# Patient Record
Sex: MALE
[De-identification: names, ages, dates, MRNs, and addresses within clinical notes are randomized per-mention and may not be internally consistent; named-entity substitution may affect disease eponyms.]

---

## 2022-08-03 PROBLEM — Z00.00 ENCOUNTER FOR PREVENTIVE HEALTH EXAMINATION: Status: ACTIVE | Noted: 2022-08-03

## 2022-09-23 ENCOUNTER — APPOINTMENT (OUTPATIENT)
Dept: ORTHOPEDIC SURGERY | Facility: CLINIC | Age: 47
End: 2022-09-23

## 2022-09-27 ENCOUNTER — APPOINTMENT (OUTPATIENT)
Dept: ORTHOPEDIC SURGERY | Facility: CLINIC | Age: 47
End: 2022-09-27

## 2022-09-27 DIAGNOSIS — S83.241A OTHER TEAR OF MEDIAL MENISCUS, CURRENT INJURY, RIGHT KNEE, INITIAL ENCOUNTER: ICD-10-CM

## 2022-09-27 PROCEDURE — 99214 OFFICE O/P EST MOD 30 MIN: CPT | Mod: 57

## 2022-09-30 PROBLEM — S83.241A TEAR OF MEDIAL MENISCUS OF RIGHT KNEE: Status: ACTIVE | Noted: 2022-09-30

## 2022-09-30 NOTE — HISTORY OF PRESENT ILLNESS
[de-identified] : Patient here for evaluation right knee pain.\par Complains of joint line pain\par Positive mechanical symptoms and catching\par \par NAD\par Right knee\par No skin breakdown\par Medial joint line ttp\par Positive odilia\par Negative lachman\par Negative varus/valgus instability\par ROM 0-130\par Pain with forced extension and flexion\par NVI\par Compartments soft and NT\par \par Xray reviewed and significant for right knee mild degenerative changes\par \par xray right knee grossly neg for pathology\par mri right knee\par IMPRESSION:\par 1. Complex medial meniscal tearing with a large posterior displaced medial meniscal flap.\par 2. Tricompartmental arthrosis most severe medially with effusion and popliteal cyst.\par 3. Mild MCL sprain and extensor mechanism tendinopathy with mild effusion and moderate popliteal cyst\par \par Plan\par Over findings with the patient.  I reviewed the MRI again.  We reviewed treatment options.  Since she has been doing conservative management with no improvement for the past year and the pain is only getting worse he would like to proceed with operative intervention.  Risks benefits and alternatives as well as operative versus non operative options were discussed.  He will be scheduled for right knee arthroscopy, medial meniscectomy.  All questions answered\par \par Operative and nonoperative options discussed with patient. Surgical risks, benefits, and alternatives explained. Surgical risks include but are not exclusive to bleeding, infection, neurovascular damage, continued pain, stiffness, scarring, rsd, dvt/pe, potential failure of surgery that may require further surgery in the future. I went over incisions and rehabilitation. All questions answered. \par \par

## 2022-10-24 ENCOUNTER — OUTPATIENT (OUTPATIENT)
Dept: OUTPATIENT SERVICES | Facility: HOSPITAL | Age: 47
LOS: 1 days | Discharge: HOME | End: 2022-10-24

## 2022-10-24 ENCOUNTER — RESULT REVIEW (OUTPATIENT)
Age: 47
End: 2022-10-24

## 2022-10-24 VITALS
HEIGHT: 66 IN | DIASTOLIC BLOOD PRESSURE: 77 MMHG | OXYGEN SATURATION: 97 % | TEMPERATURE: 96 F | SYSTOLIC BLOOD PRESSURE: 156 MMHG | RESPIRATION RATE: 18 BRPM | HEART RATE: 82 BPM | WEIGHT: 184.97 LBS

## 2022-10-24 DIAGNOSIS — Z98.890 OTHER SPECIFIED POSTPROCEDURAL STATES: Chronic | ICD-10-CM

## 2022-10-24 DIAGNOSIS — S83.231D COMPLEX TEAR OF MEDIAL MENISCUS, CURRENT INJURY, RIGHT KNEE, SUBSEQUENT ENCOUNTER: ICD-10-CM

## 2022-10-24 DIAGNOSIS — Z01.818 ENCOUNTER FOR OTHER PREPROCEDURAL EXAMINATION: ICD-10-CM

## 2022-10-24 LAB
A1C WITH ESTIMATED AVERAGE GLUCOSE RESULT: 7.3 % — HIGH (ref 4–5.6)
ALBUMIN SERPL ELPH-MCNC: 4.5 G/DL — SIGNIFICANT CHANGE UP (ref 3.5–5.2)
ALP SERPL-CCNC: 70 U/L — SIGNIFICANT CHANGE UP (ref 30–115)
ALT FLD-CCNC: 30 U/L — SIGNIFICANT CHANGE UP (ref 0–41)
ANION GAP SERPL CALC-SCNC: 14 MMOL/L — SIGNIFICANT CHANGE UP (ref 7–14)
APTT BLD: 29.9 SEC — SIGNIFICANT CHANGE UP (ref 27–39.2)
AST SERPL-CCNC: 18 U/L — SIGNIFICANT CHANGE UP (ref 0–41)
BASOPHILS # BLD AUTO: 0.12 K/UL — SIGNIFICANT CHANGE UP (ref 0–0.2)
BASOPHILS NFR BLD AUTO: 1 % — SIGNIFICANT CHANGE UP (ref 0–1)
BILIRUB SERPL-MCNC: 0.4 MG/DL — SIGNIFICANT CHANGE UP (ref 0.2–1.2)
BUN SERPL-MCNC: 14 MG/DL — SIGNIFICANT CHANGE UP (ref 10–20)
CALCIUM SERPL-MCNC: 9.4 MG/DL — SIGNIFICANT CHANGE UP (ref 8.4–10.5)
CHLORIDE SERPL-SCNC: 103 MMOL/L — SIGNIFICANT CHANGE UP (ref 98–110)
CO2 SERPL-SCNC: 24 MMOL/L — SIGNIFICANT CHANGE UP (ref 17–32)
CREAT SERPL-MCNC: 0.7 MG/DL — SIGNIFICANT CHANGE UP (ref 0.7–1.5)
EGFR: 114 ML/MIN/1.73M2 — SIGNIFICANT CHANGE UP
EOSINOPHIL # BLD AUTO: 0.39 K/UL — SIGNIFICANT CHANGE UP (ref 0–0.7)
EOSINOPHIL NFR BLD AUTO: 3.4 % — SIGNIFICANT CHANGE UP (ref 0–8)
ESTIMATED AVERAGE GLUCOSE: 163 MG/DL — HIGH (ref 68–114)
GLUCOSE SERPL-MCNC: 141 MG/DL — HIGH (ref 70–99)
HCT VFR BLD CALC: 48.9 % — SIGNIFICANT CHANGE UP (ref 42–52)
HGB BLD-MCNC: 16.3 G/DL — SIGNIFICANT CHANGE UP (ref 14–18)
IMM GRANULOCYTES NFR BLD AUTO: 0.9 % — HIGH (ref 0.1–0.3)
INR BLD: 0.92 RATIO — SIGNIFICANT CHANGE UP (ref 0.65–1.3)
LYMPHOCYTES # BLD AUTO: 3.66 K/UL — HIGH (ref 1.2–3.4)
LYMPHOCYTES # BLD AUTO: 31.5 % — SIGNIFICANT CHANGE UP (ref 20.5–51.1)
MCHC RBC-ENTMCNC: 29.6 PG — SIGNIFICANT CHANGE UP (ref 27–31)
MCHC RBC-ENTMCNC: 33.3 G/DL — SIGNIFICANT CHANGE UP (ref 32–37)
MCV RBC AUTO: 88.7 FL — SIGNIFICANT CHANGE UP (ref 80–94)
MONOCYTES # BLD AUTO: 1.02 K/UL — HIGH (ref 0.1–0.6)
MONOCYTES NFR BLD AUTO: 8.8 % — SIGNIFICANT CHANGE UP (ref 1.7–9.3)
NEUTROPHILS # BLD AUTO: 6.31 K/UL — SIGNIFICANT CHANGE UP (ref 1.4–6.5)
NEUTROPHILS NFR BLD AUTO: 54.4 % — SIGNIFICANT CHANGE UP (ref 42.2–75.2)
NRBC # BLD: 0 /100 WBCS — SIGNIFICANT CHANGE UP (ref 0–0)
PLATELET # BLD AUTO: 273 K/UL — SIGNIFICANT CHANGE UP (ref 130–400)
POTASSIUM SERPL-MCNC: 4 MMOL/L — SIGNIFICANT CHANGE UP (ref 3.5–5)
POTASSIUM SERPL-SCNC: 4 MMOL/L — SIGNIFICANT CHANGE UP (ref 3.5–5)
PROT SERPL-MCNC: 6.3 G/DL — SIGNIFICANT CHANGE UP (ref 6–8)
PROTHROM AB SERPL-ACNC: 10.5 SEC — SIGNIFICANT CHANGE UP (ref 9.95–12.87)
RBC # BLD: 5.51 M/UL — SIGNIFICANT CHANGE UP (ref 4.7–6.1)
RBC # FLD: 12.8 % — SIGNIFICANT CHANGE UP (ref 11.5–14.5)
SODIUM SERPL-SCNC: 141 MMOL/L — SIGNIFICANT CHANGE UP (ref 135–146)
WBC # BLD: 11.61 K/UL — HIGH (ref 4.8–10.8)
WBC # FLD AUTO: 11.61 K/UL — HIGH (ref 4.8–10.8)

## 2022-10-24 PROCEDURE — 71046 X-RAY EXAM CHEST 2 VIEWS: CPT | Mod: 26

## 2022-10-24 PROCEDURE — 93010 ELECTROCARDIOGRAM REPORT: CPT

## 2022-10-24 NOTE — H&P PST ADULT - REASON FOR ADMISSION
46 Y/O M SCHEDULED FOR PAST RIGHT KNEE ARTHROSCOPY ON 11/17/22 WITH DR MARTINES UNDER [facility]. PT REPORTS OVER 1 YEAR OF RIGHT KNEE PAIN AND BUCKLING.

## 2022-10-24 NOTE — H&P PST ADULT - NSANTHOSAYNRD_GEN_A_CORE
No. YESSY screening performed.  STOP BANG Legend: 0-2 = LOW Risk; 3-4 = INTERMEDIATE Risk; 5-8 = HIGH Risk

## 2022-10-24 NOTE — H&P PST ADULT - HISTORY OF PRESENT ILLNESS
CURRENTLY  DENIES ANY CP, SOB, PALPITATIONS, COUGH OR DYSURIA  EXERCISE TOLERANCE 1 FOS WITHOUT SOB    AS PER PATIENT  this is his/her complete medical history including medications - PRESCRIPTIONS  OVER THE COUNTER MEDS    pt denies any covid s/s, 2020 tested positive in the past.  Received covid vaccine X 3  pt advised self quarantine till day of procedure    Anesthesia Alert  YES--Difficult Airway CLASS IV  NO--History of neck surgery or radiation  NO--Limited ROM of neck  NO--History of Malignant hyperthermia  NO--No personal or family history of Pseudocholinesterase deficiency.  NO--Prior Anesthesia Complication  NO--Latex Allergy  NO--Loose teeth  NO--History of Rheumatoid Arthritis  NO--YESSY  NO--Bleeding risk  NO--Other_____    Patient verbalized understanding of instructions and was given the opportunity to ask questions and have them answered.

## 2022-10-30 ENCOUNTER — LABORATORY RESULT (OUTPATIENT)
Age: 47
End: 2022-10-30

## 2022-11-01 NOTE — ASU PATIENT PROFILE, ADULT - NS PREOP MEDICATION GIVEN
Chronic problem.  Lab Results   Component Value Date    TSH 2.082 03/31/2020     Not on any medication           n/a

## 2022-11-02 ENCOUNTER — TRANSCRIPTION ENCOUNTER (OUTPATIENT)
Age: 47
End: 2022-11-02

## 2022-11-02 ENCOUNTER — APPOINTMENT (OUTPATIENT)
Age: 47
End: 2022-11-02

## 2022-11-02 ENCOUNTER — OUTPATIENT (OUTPATIENT)
Dept: OUTPATIENT SERVICES | Facility: HOSPITAL | Age: 47
LOS: 1 days | Discharge: HOME | End: 2022-11-02

## 2022-11-02 VITALS
HEART RATE: 74 BPM | OXYGEN SATURATION: 98 % | HEIGHT: 66 IN | RESPIRATION RATE: 18 BRPM | DIASTOLIC BLOOD PRESSURE: 78 MMHG | SYSTOLIC BLOOD PRESSURE: 131 MMHG | WEIGHT: 184.97 LBS | TEMPERATURE: 98 F

## 2022-11-02 VITALS
SYSTOLIC BLOOD PRESSURE: 140 MMHG | HEART RATE: 76 BPM | DIASTOLIC BLOOD PRESSURE: 76 MMHG | RESPIRATION RATE: 16 BRPM | OXYGEN SATURATION: 99 %

## 2022-11-02 DIAGNOSIS — Z98.890 OTHER SPECIFIED POSTPROCEDURAL STATES: Chronic | ICD-10-CM

## 2022-11-02 DIAGNOSIS — S83.249A OTHER TEAR OF MEDIAL MENISCUS, CURRENT INJURY, UNSPECIFIED KNEE, INITIAL ENCOUNTER: ICD-10-CM

## 2022-11-02 LAB
GLUCOSE BLDC GLUCOMTR-MCNC: 104 MG/DL — HIGH (ref 70–99)

## 2022-11-02 PROCEDURE — 29881 ARTHRS KNE SRG MNISECTMY M/L: CPT | Mod: RT

## 2022-11-02 RX ORDER — HYDROMORPHONE HYDROCHLORIDE 2 MG/ML
1 INJECTION INTRAMUSCULAR; INTRAVENOUS; SUBCUTANEOUS
Refills: 0 | Status: DISCONTINUED | OUTPATIENT
Start: 2022-11-02 | End: 2022-11-02

## 2022-11-02 RX ORDER — OXYCODONE AND ACETAMINOPHEN 5; 325 MG/1; MG/1
1 TABLET ORAL
Qty: 12 | Refills: 0
Start: 2022-11-02

## 2022-11-02 RX ORDER — HYDROMORPHONE HYDROCHLORIDE 2 MG/ML
0.5 INJECTION INTRAMUSCULAR; INTRAVENOUS; SUBCUTANEOUS
Refills: 0 | Status: DISCONTINUED | OUTPATIENT
Start: 2022-11-02 | End: 2022-11-02

## 2022-11-02 RX ORDER — ONDANSETRON 8 MG/1
4 TABLET, FILM COATED ORAL ONCE
Refills: 0 | Status: DISCONTINUED | OUTPATIENT
Start: 2022-11-02 | End: 2022-11-17

## 2022-11-02 RX ADMIN — HYDROMORPHONE HYDROCHLORIDE 0.5 MILLIGRAM(S): 2 INJECTION INTRAMUSCULAR; INTRAVENOUS; SUBCUTANEOUS at 15:46

## 2022-11-02 RX ADMIN — HYDROMORPHONE HYDROCHLORIDE 1 MILLIGRAM(S): 2 INJECTION INTRAMUSCULAR; INTRAVENOUS; SUBCUTANEOUS at 14:41

## 2022-11-02 RX ADMIN — HYDROMORPHONE HYDROCHLORIDE 0.5 MILLIGRAM(S): 2 INJECTION INTRAMUSCULAR; INTRAVENOUS; SUBCUTANEOUS at 15:27

## 2022-11-02 RX ADMIN — HYDROMORPHONE HYDROCHLORIDE 0.5 MILLIGRAM(S): 2 INJECTION INTRAMUSCULAR; INTRAVENOUS; SUBCUTANEOUS at 14:59

## 2022-11-02 RX ADMIN — HYDROMORPHONE HYDROCHLORIDE 1 MILLIGRAM(S): 2 INJECTION INTRAMUSCULAR; INTRAVENOUS; SUBCUTANEOUS at 15:46

## 2022-11-02 NOTE — ASU DISCHARGE PLAN (ADULT/PEDIATRIC) - CARE PROVIDER_API CALL
Remigio Cain)  [facility]  [address] [place] [address] [place] [address]  Phone: [phone]  Fax: [phone]  Follow Up Time:

## 2022-11-02 NOTE — ASU DISCHARGE PLAN (ADULT/PEDIATRIC) - ASU DC SPECIAL INSTRUCTIONSFT
Post Operative Instructions for Knee Surgery    Your Surgery Included  [x ] Menisectomy  [ ] Meniscus Repair					  [x ] Synovectomy/Plica Excision	  [x ] Debridement/Chondroplasty  [ ] Lysis of Adhesions  [ ] ACL reconstruction			  [ ] PCL Reconstruction  [ ] Other:  	  Call our office ([phone]) immediately if you experience any of the following:  •	Excessive bleeding or pus like drainage at the incision site  •	Uncontrollable pain not relieved by pain medication  •	Excessive swelling or redness at the incision site  •	Fever above 101.5 degrees not controlled with Tylenol or Motrin  •	Shortness of Breath  •	Any foul odor or blistering from the surgery site    Pain Management: You were given one or more prescriptions before leaving the hospital. Have the prescriptions filled at a pharmacy on your way home and follow the instructions on the bottles.   Regional Anesthesia Injections (Blocks): You may have been given a regional nerve block either before or after surgery. This may numb your leg for 24-36 hours  	*Proceed with caution when weight bearing on your leg    Diet: Eat a bland diet for the first day after surgery. Progress your diet as tolerated. Constipation may occur with Narcotic usage, contact our office if you are experiencing constipation.    Activity: Limit your activity during the first 48 hours, keep your leg elevated with pillows under your heel. After the first 48 hours at home, increase your activity level based on your symptoms.    Dressing Change: Remove the dressing on the 3rd day. It is normal for some blood to be seen on the dressings. It is also normal for you to see apparent bruising on the skin around your knee when you remove the dressing. If present, leave the steri-strip tape across the incisions. If you are concerned by the drainage or the appearance of your knee, please call one of the numbers listed. Keep covered with Band-Aids/bandages.    Showering: You may shower on the 5th day after surgery if the wound is dry and clean, but do not let the wound soak in water until sutures are removed. Do not submerge in any water until after your postoperative appointment in clinic.    Weight Bearing:						  [x] Weight bearing as tolerated		  [ ] Nonweight bearing				  [ ] Other:						    Operative Knee Range of Motion  [x ] Full range of motion  [ ] ROM 0-90 deg  [ ] Other:    Knee Exercises: You may do these exercises for 2-5 mins five times a day in order to help regain your range of motion.  [ x] Quad Sets: Begin activating your quadriceps muscle by driving your knee downward into full knee extension while seated on a table or bed   with a towel rolled and propped under your heel  [x ] Straight Leg Raise: While levi your quadriceps muscle, lift your fully extended leg to the level of your non-operative knee  [ x] Heel Slides: With the knee straight, slide your heel slowly toward your buttocks, hold at the endpoint for 10-15 seconds, then slowly straighten  [x ] Ankle Pumps: With your knee straight, move your ankle in a "pumping"  fashion to activate your calf and leg muscle     Follow Up: As Scheduled

## 2022-11-02 NOTE — BRIEF OPERATIVE NOTE - NSICDXBRIEFPROCEDURE_GEN_ALL_CORE_FT
PROCEDURES:  Arthroscopic medial meniscectomy 02-Nov-2022 14:13:09  Remigio Cain  Major arthroscopic synovectomy of knee 02-Nov-2022 14:13:14  Remigio Cain

## 2022-11-02 NOTE — ASU DISCHARGE PLAN (ADULT/PEDIATRIC) - NS MD DC FALL RISK RISK
For information on Fall & Injury Prevention, visit: [email or web address] OR  [email or web address] OR  https://www.cdc.gov/steadi/patient.html

## 2022-11-10 ENCOUNTER — APPOINTMENT (OUTPATIENT)
Dept: ORTHOPEDIC SURGERY | Facility: CLINIC | Age: 47
End: 2022-11-10

## 2022-11-10 DIAGNOSIS — M25.561 PAIN IN RIGHT KNEE: ICD-10-CM

## 2022-11-10 PROCEDURE — 99024 POSTOP FOLLOW-UP VISIT: CPT

## 2022-11-10 NOTE — HISTORY OF PRESENT ILLNESS
[de-identified] : Patient is s/p right knee arthroscopy. \par Doing well.\par Pain controlled.\par \par NAD\par Right knee:\par Incisions healed, c/d/i\par Mild swelling\par ROM 0-120\par Neg Instability\par NVI\par Compartments soft and NT\par \par s/p right knee arthroscopy\par Went over surgery in detail\par Will start PT\par Continue pain control\par fu in 6 weeks\par All questions answered\par

## 2022-11-14 DIAGNOSIS — M65.861 OTHER SYNOVITIS AND TENOSYNOVITIS, RIGHT LOWER LEG: ICD-10-CM

## 2022-11-14 DIAGNOSIS — M94.261 CHONDROMALACIA, RIGHT KNEE: ICD-10-CM

## 2022-11-14 DIAGNOSIS — E78.00 PURE HYPERCHOLESTEROLEMIA, UNSPECIFIED: ICD-10-CM

## 2022-11-14 DIAGNOSIS — F17.210 NICOTINE DEPENDENCE, CIGARETTES, UNCOMPLICATED: ICD-10-CM

## 2022-11-14 DIAGNOSIS — Z79.84 LONG TERM (CURRENT) USE OF ORAL HYPOGLYCEMIC DRUGS: ICD-10-CM

## 2022-11-14 DIAGNOSIS — E11.9 TYPE 2 DIABETES MELLITUS WITHOUT COMPLICATIONS: ICD-10-CM

## 2022-11-14 DIAGNOSIS — M23.203 DERANGEMENT OF UNSPECIFIED MEDIAL MENISCUS DUE TO OLD TEAR OR INJURY, RIGHT KNEE: ICD-10-CM

## 2022-11-14 DIAGNOSIS — Z79.899 OTHER LONG TERM (CURRENT) DRUG THERAPY: ICD-10-CM

## 2022-12-01 NOTE — H&P PST ADULT - BP NONINVASIVE MEAN (MM HG)
No deformities present No deformities present No deformities present No deformities present No deformities present No deformities present No deformities present No deformities present 103 No deformities present No deformities present No deformities present No deformities present No deformities present

## 2023-11-13 RX ORDER — ATORVASTATIN CALCIUM 80 MG/1
1 TABLET, FILM COATED ORAL
Qty: 0 | Refills: 0 | DISCHARGE

## 2023-11-13 RX ORDER — METFORMIN HYDROCHLORIDE 850 MG/1
1 TABLET ORAL
Qty: 0 | Refills: 0 | DISCHARGE

## 2023-11-13 RX ORDER — CHOLECALCIFEROL (VITAMIN D3) 125 MCG
0 CAPSULE ORAL
Qty: 0 | Refills: 0 | DISCHARGE